# Patient Record
Sex: MALE | Race: WHITE | NOT HISPANIC OR LATINO
[De-identification: names, ages, dates, MRNs, and addresses within clinical notes are randomized per-mention and may not be internally consistent; named-entity substitution may affect disease eponyms.]

---

## 2017-04-18 ENCOUNTER — RX RENEWAL (OUTPATIENT)
Age: 67
End: 2017-04-18

## 2017-06-29 ENCOUNTER — APPOINTMENT (OUTPATIENT)
Dept: OPHTHALMOLOGY | Facility: CLINIC | Age: 67
End: 2017-06-29

## 2017-11-30 ENCOUNTER — APPOINTMENT (OUTPATIENT)
Dept: OPHTHALMOLOGY | Facility: CLINIC | Age: 67
End: 2017-11-30

## 2018-02-26 ENCOUNTER — APPOINTMENT (OUTPATIENT)
Dept: OPHTHALMOLOGY | Facility: CLINIC | Age: 68
End: 2018-02-26
Payer: MEDICARE

## 2018-02-26 PROCEDURE — 92083 EXTENDED VISUAL FIELD XM: CPT

## 2018-02-26 PROCEDURE — 92133 CPTRZD OPH DX IMG PST SGM ON: CPT

## 2018-02-26 PROCEDURE — 92012 INTRM OPH EXAM EST PATIENT: CPT

## 2018-09-10 ENCOUNTER — APPOINTMENT (OUTPATIENT)
Dept: OPHTHALMOLOGY | Facility: CLINIC | Age: 68
End: 2018-09-10
Payer: MEDICARE

## 2018-09-10 DIAGNOSIS — H31.101: ICD-10-CM

## 2018-09-10 PROCEDURE — 92014 COMPRE OPH EXAM EST PT 1/>: CPT

## 2018-09-10 PROCEDURE — 92020 GONIOSCOPY: CPT

## 2018-09-10 PROCEDURE — 92250 FUNDUS PHOTOGRAPHY W/I&R: CPT

## 2018-09-10 PROCEDURE — 92083 EXTENDED VISUAL FIELD XM: CPT

## 2018-09-10 PROCEDURE — 92226: CPT | Mod: RT

## 2019-02-01 ENCOUNTER — NON-APPOINTMENT (OUTPATIENT)
Age: 69
End: 2019-02-01

## 2019-02-01 ENCOUNTER — APPOINTMENT (OUTPATIENT)
Dept: INTERNAL MEDICINE | Facility: CLINIC | Age: 69
End: 2019-02-01
Payer: MEDICARE

## 2019-02-01 VITALS
OXYGEN SATURATION: 99 % | BODY MASS INDEX: 30.37 KG/M2 | TEMPERATURE: 97.2 F | DIASTOLIC BLOOD PRESSURE: 89 MMHG | HEIGHT: 66.14 IN | SYSTOLIC BLOOD PRESSURE: 168 MMHG | WEIGHT: 189 LBS | HEART RATE: 63 BPM

## 2019-02-01 DIAGNOSIS — Z78.9 OTHER SPECIFIED HEALTH STATUS: ICD-10-CM

## 2019-02-01 DIAGNOSIS — Z87.891 PERSONAL HISTORY OF NICOTINE DEPENDENCE: ICD-10-CM

## 2019-02-01 DIAGNOSIS — E66.9 OBESITY, UNSPECIFIED: ICD-10-CM

## 2019-02-01 DIAGNOSIS — Z87.19 PERSONAL HISTORY OF OTHER DISEASES OF THE DIGESTIVE SYSTEM: ICD-10-CM

## 2019-02-01 DIAGNOSIS — Z83.3 FAMILY HISTORY OF DIABETES MELLITUS: ICD-10-CM

## 2019-02-01 PROCEDURE — G0439: CPT

## 2019-02-01 PROCEDURE — 93000 ELECTROCARDIOGRAM COMPLETE: CPT

## 2019-02-01 NOTE — ASSESSMENT
[FreeTextEntry1] : CPE OF 68 Y OLD MALE WITH PMX OF HTN,ED,BPH AND IFG= LABS REVIEWED,EKG TODAY\par RTO 4 M

## 2019-03-13 ENCOUNTER — APPOINTMENT (OUTPATIENT)
Dept: OPHTHALMOLOGY | Facility: CLINIC | Age: 69
End: 2019-03-13
Payer: MEDICARE

## 2019-03-13 PROCEDURE — 92133 CPTRZD OPH DX IMG PST SGM ON: CPT

## 2019-03-13 PROCEDURE — 92014 COMPRE OPH EXAM EST PT 1/>: CPT

## 2019-03-13 PROCEDURE — 92083 EXTENDED VISUAL FIELD XM: CPT

## 2019-03-13 PROCEDURE — 76514 ECHO EXAM OF EYE THICKNESS: CPT

## 2019-06-03 ENCOUNTER — APPOINTMENT (OUTPATIENT)
Dept: INTERNAL MEDICINE | Facility: CLINIC | Age: 69
End: 2019-06-03
Payer: MEDICARE

## 2019-06-03 VITALS
DIASTOLIC BLOOD PRESSURE: 83 MMHG | RESPIRATION RATE: 14 BRPM | SYSTOLIC BLOOD PRESSURE: 145 MMHG | BODY MASS INDEX: 30.37 KG/M2 | TEMPERATURE: 98.1 F | WEIGHT: 189 LBS | HEART RATE: 70 BPM | OXYGEN SATURATION: 98 % | HEIGHT: 66 IN

## 2019-06-03 VITALS — SYSTOLIC BLOOD PRESSURE: 135 MMHG | DIASTOLIC BLOOD PRESSURE: 85 MMHG

## 2019-06-03 PROCEDURE — 99214 OFFICE O/P EST MOD 30 MIN: CPT

## 2019-06-03 NOTE — PHYSICAL EXAM
[Well Nourished] : well nourished [No Acute Distress] : no acute distress [Well Developed] : well developed [Well-Appearing] : well-appearing [Normal Sclera/Conjunctiva] : normal sclera/conjunctiva [PERRL] : pupils equal round and reactive to light [EOMI] : extraocular movements intact [Normal Oropharynx] : the oropharynx was normal [Normal Outer Ear/Nose] : the outer ears and nose were normal in appearance [No JVD] : no jugular venous distention [No Lymphadenopathy] : no lymphadenopathy [Supple] : supple [No Respiratory Distress] : no respiratory distress  [Thyroid Normal, No Nodules] : the thyroid was normal and there were no nodules present [Clear to Auscultation] : lungs were clear to auscultation bilaterally [Normal Rate] : normal rate  [No Accessory Muscle Use] : no accessory muscle use [Normal S1, S2] : normal S1 and S2 [Regular Rhythm] : with a regular rhythm [No Carotid Bruits] : no carotid bruits [No Murmur] : no murmur heard [No Varicosities] : no varicosities [No Abdominal Bruit] : a ~M bruit was not heard ~T in the abdomen [No Edema] : there was no peripheral edema [Pedal Pulses Present] : the pedal pulses are present [No Extremity Clubbing/Cyanosis] : no extremity clubbing/cyanosis [No Palpable Aorta] : no palpable aorta [Soft] : abdomen soft [Non Tender] : non-tender [Non-distended] : non-distended [No HSM] : no HSM [No Masses] : no abdominal mass palpated [Normal Bowel Sounds] : normal bowel sounds [Normal Posterior Cervical Nodes] : no posterior cervical lymphadenopathy [Normal Anterior Cervical Nodes] : no anterior cervical lymphadenopathy [No CVA Tenderness] : no CVA  tenderness [No Spinal Tenderness] : no spinal tenderness [Grossly Normal Strength/Tone] : grossly normal strength/tone [No Joint Swelling] : no joint swelling [No Rash] : no rash [Normal Gait] : normal gait [Coordination Grossly Intact] : coordination grossly intact [No Focal Deficits] : no focal deficits [Deep Tendon Reflexes (DTR)] : deep tendon reflexes were 2+ and symmetric [Normal Affect] : the affect was normal [Normal Insight/Judgement] : insight and judgment were intact

## 2019-06-03 NOTE — ASSESSMENT
[FreeTextEntry1] : F/U VISIT OF 68 Y OLD MALE WITH PMX OF HTN,IFG AND BPH= LABS ORDERED\par OF NOTE PT HAD STOP TELMISARTAN -HCTZ ON HIS OWN 2 M AGO WITH HOME /80 IN AVERAGE\par RTO IN 4 M

## 2019-06-03 NOTE — HISTORY OF PRESENT ILLNESS
[de-identified] : PT COMES FOR F/U \par HAD STOP TELMISARTAN /HCTZ ON HIS OWN 2 M AGO ;BP AT HOME 130/80 IN AVERAGE

## 2019-06-04 LAB
ALBUMIN SERPL ELPH-MCNC: 4.7 G/DL
ALP BLD-CCNC: 56 U/L
ALT SERPL-CCNC: 26 U/L
ANION GAP SERPL CALC-SCNC: 13 MMOL/L
AST SERPL-CCNC: 26 U/L
BILIRUB SERPL-MCNC: 0.6 MG/DL
BUN SERPL-MCNC: 20 MG/DL
CALCIUM SERPL-MCNC: 9.7 MG/DL
CHLORIDE SERPL-SCNC: 105 MMOL/L
CO2 SERPL-SCNC: 23 MMOL/L
CREAT SERPL-MCNC: 0.96 MG/DL
ESTIMATED AVERAGE GLUCOSE: 105 MG/DL
GLUCOSE SERPL-MCNC: 77 MG/DL
HBA1C MFR BLD HPLC: 5.3 %
POTASSIUM SERPL-SCNC: 4.4 MMOL/L
PROT SERPL-MCNC: 6.6 G/DL
SODIUM SERPL-SCNC: 141 MMOL/L

## 2019-09-18 ENCOUNTER — APPOINTMENT (OUTPATIENT)
Dept: OPHTHALMOLOGY | Facility: CLINIC | Age: 69
End: 2019-09-18

## 2019-10-07 ENCOUNTER — APPOINTMENT (OUTPATIENT)
Dept: INTERNAL MEDICINE | Facility: CLINIC | Age: 69
End: 2019-10-07

## 2019-11-01 ENCOUNTER — APPOINTMENT (OUTPATIENT)
Dept: INTERNAL MEDICINE | Facility: CLINIC | Age: 69
End: 2019-11-01
Payer: MEDICARE

## 2019-11-01 VITALS
RESPIRATION RATE: 12 BRPM | TEMPERATURE: 97.8 F | DIASTOLIC BLOOD PRESSURE: 80 MMHG | OXYGEN SATURATION: 97 % | WEIGHT: 188 LBS | BODY MASS INDEX: 30.22 KG/M2 | HEART RATE: 64 BPM | HEIGHT: 66 IN | SYSTOLIC BLOOD PRESSURE: 144 MMHG

## 2019-11-01 VITALS — DIASTOLIC BLOOD PRESSURE: 88 MMHG | SYSTOLIC BLOOD PRESSURE: 138 MMHG

## 2019-11-01 PROCEDURE — 99214 OFFICE O/P EST MOD 30 MIN: CPT

## 2019-11-01 NOTE — ASSESSMENT
[FreeTextEntry1] : 68 Y OLD MALE WITH PMX OF HTN AND IFG = CONTINUE DIET AND EXERCISE\par RTO IN 4 M\par DECLINES INFLUENZA VACCINE

## 2019-12-11 ENCOUNTER — NON-APPOINTMENT (OUTPATIENT)
Age: 69
End: 2019-12-11

## 2019-12-11 ENCOUNTER — APPOINTMENT (OUTPATIENT)
Dept: OPHTHALMOLOGY | Facility: CLINIC | Age: 69
End: 2019-12-11
Payer: MEDICARE

## 2019-12-11 PROCEDURE — 92012 INTRM OPH EXAM EST PATIENT: CPT

## 2019-12-11 PROCEDURE — 92083 EXTENDED VISUAL FIELD XM: CPT

## 2020-02-03 ENCOUNTER — APPOINTMENT (OUTPATIENT)
Dept: INTERNAL MEDICINE | Facility: CLINIC | Age: 70
End: 2020-02-03

## 2020-03-11 ENCOUNTER — NON-APPOINTMENT (OUTPATIENT)
Age: 70
End: 2020-03-11

## 2020-03-11 ENCOUNTER — APPOINTMENT (OUTPATIENT)
Dept: INTERNAL MEDICINE | Facility: CLINIC | Age: 70
End: 2020-03-11
Payer: MEDICARE

## 2020-03-11 ENCOUNTER — LABORATORY RESULT (OUTPATIENT)
Age: 70
End: 2020-03-11

## 2020-03-11 VITALS
HEART RATE: 65 BPM | OXYGEN SATURATION: 98 % | BODY MASS INDEX: 30.53 KG/M2 | RESPIRATION RATE: 13 BRPM | TEMPERATURE: 97.4 F | WEIGHT: 190 LBS | SYSTOLIC BLOOD PRESSURE: 170 MMHG | DIASTOLIC BLOOD PRESSURE: 90 MMHG | HEIGHT: 66 IN

## 2020-03-11 VITALS — DIASTOLIC BLOOD PRESSURE: 90 MMHG | SYSTOLIC BLOOD PRESSURE: 150 MMHG

## 2020-03-11 VITALS — DIASTOLIC BLOOD PRESSURE: 88 MMHG | SYSTOLIC BLOOD PRESSURE: 145 MMHG

## 2020-03-11 LAB
25(OH)D3 SERPL-MCNC: 39.5 NG/ML
ALBUMIN SERPL ELPH-MCNC: 4.7 G/DL
ALP BLD-CCNC: 57 U/L
ALT SERPL-CCNC: 28 U/L
ANION GAP SERPL CALC-SCNC: 16 MMOL/L
AST SERPL-CCNC: 28 U/L
BASOPHILS # BLD AUTO: 0.02 K/UL
BASOPHILS NFR BLD AUTO: 0.3 %
BILIRUB SERPL-MCNC: 0.6 MG/DL
BUN SERPL-MCNC: 13 MG/DL
CALCIUM SERPL-MCNC: 9.7 MG/DL
CHLORIDE SERPL-SCNC: 104 MMOL/L
CO2 SERPL-SCNC: 22 MMOL/L
CREAT SERPL-MCNC: 1.02 MG/DL
EOSINOPHIL # BLD AUTO: 0.03 K/UL
EOSINOPHIL NFR BLD AUTO: 0.5 %
ESTIMATED AVERAGE GLUCOSE: 108 MG/DL
GLUCOSE SERPL-MCNC: 75 MG/DL
HBA1C MFR BLD HPLC: 5.4 %
HCT VFR BLD CALC: 48.1 %
HGB BLD-MCNC: 15.5 G/DL
IMM GRANULOCYTES NFR BLD AUTO: 0.2 %
LYMPHOCYTES # BLD AUTO: 1.94 K/UL
LYMPHOCYTES NFR BLD AUTO: 29.6 %
MAN DIFF?: NORMAL
MCHC RBC-ENTMCNC: 30.1 PG
MCHC RBC-ENTMCNC: 32.2 GM/DL
MCV RBC AUTO: 93.4 FL
MONOCYTES # BLD AUTO: 0.4 K/UL
MONOCYTES NFR BLD AUTO: 6.1 %
NEUTROPHILS # BLD AUTO: 4.15 K/UL
NEUTROPHILS NFR BLD AUTO: 63.3 %
PLATELET # BLD AUTO: 195 K/UL
POTASSIUM SERPL-SCNC: 4.7 MMOL/L
PROT SERPL-MCNC: 7.2 G/DL
PSA FREE FLD-MCNC: 43 %
PSA FREE SERPL-MCNC: 0.12 NG/ML
PSA SERPL-MCNC: 0.27 NG/ML
RBC # BLD: 5.15 M/UL
RBC # FLD: 13.3 %
SODIUM SERPL-SCNC: 142 MMOL/L
TSH SERPL-ACNC: 1.63 UIU/ML
WBC # FLD AUTO: 6.55 K/UL

## 2020-03-11 PROCEDURE — G0439: CPT

## 2020-03-11 PROCEDURE — 93000 ELECTROCARDIOGRAM COMPLETE: CPT

## 2020-03-11 NOTE — HISTORY OF PRESENT ILLNESS
[de-identified] : PT SEEN BY ROB REGULARLY MEDS HAD BEEN CHANGED\par BP AT HOME 130-80 IN AVERAGE \par HAD COLONOSCOPY ABOUT 2 Y AGO

## 2020-03-11 NOTE — ASSESSMENT
[FreeTextEntry1] : CPE  OF 69 Y OLD MALE WITH PMX OF HTN AND IFG ON DIET= LABS AND EKG TODAY \par RTO IN 4 M \par WEEKLY BP MONITORING AT HOME\par GLAUCOMA = AS PER OPHTHALMOLOGY

## 2020-03-12 LAB
APPEARANCE: CLEAR
BILIRUBIN URINE: NEGATIVE
BLOOD URINE: NEGATIVE
COLOR: NORMAL
CREAT SPEC-SCNC: 48 MG/DL
GLUCOSE QUALITATIVE U: NEGATIVE
KETONES URINE: NEGATIVE
LEUKOCYTE ESTERASE URINE: ABNORMAL
MICROALBUMIN 24H UR DL<=1MG/L-MCNC: <1.2 MG/DL
MICROALBUMIN/CREAT 24H UR-RTO: NORMAL MG/G
NITRITE URINE: NEGATIVE
PH URINE: 6.5
PROTEIN URINE: NEGATIVE
SPECIFIC GRAVITY URINE: 1.01
UROBILINOGEN URINE: NORMAL

## 2020-03-13 LAB
CHOLEST SERPL-MCNC: 172 MG/DL
CHOLEST/HDLC SERPL: 2.8 RATIO
HDLC SERPL-MCNC: 61 MG/DL
LDLC SERPL CALC-MCNC: 91 MG/DL
TRIGL SERPL-MCNC: 98 MG/DL

## 2020-06-15 ENCOUNTER — APPOINTMENT (OUTPATIENT)
Dept: HEART AND VASCULAR | Facility: CLINIC | Age: 70
End: 2020-06-15
Payer: MEDICARE

## 2020-06-15 PROCEDURE — 36415 COLL VENOUS BLD VENIPUNCTURE: CPT | Mod: CS

## 2020-06-16 LAB
SARS-COV-2 IGG SERPL IA-ACNC: <0.1 INDEX
SARS-COV-2 IGG SERPL QL IA: NEGATIVE

## 2020-07-15 ENCOUNTER — APPOINTMENT (OUTPATIENT)
Dept: INTERNAL MEDICINE | Facility: CLINIC | Age: 70
End: 2020-07-15
Payer: MEDICARE

## 2020-07-15 VITALS
OXYGEN SATURATION: 99 % | TEMPERATURE: 98.1 F | HEIGHT: 66 IN | RESPIRATION RATE: 14 BRPM | HEART RATE: 55 BPM | SYSTOLIC BLOOD PRESSURE: 140 MMHG | DIASTOLIC BLOOD PRESSURE: 78 MMHG | BODY MASS INDEX: 29.41 KG/M2 | WEIGHT: 183 LBS

## 2020-07-15 DIAGNOSIS — Z20.828 CONTACT WITH AND (SUSPECTED) EXPOSURE TO OTHER VIRAL COMMUNICABLE DISEASES: ICD-10-CM

## 2020-07-15 PROCEDURE — 99214 OFFICE O/P EST MOD 30 MIN: CPT | Mod: CS

## 2020-07-15 NOTE — HISTORY OF PRESENT ILLNESS
[de-identified] : PT COMES FOR F/U HTN ,DYSLIPIDEMIA AND IFG= LAST LABS 3/2020 WNL\par LOOSING WT WITH LOW FAT LOW CARB DIET

## 2020-07-15 NOTE — PHYSICAL EXAM
[Well Developed] : well developed [Well Nourished] : well nourished [No Acute Distress] : no acute distress [Well-Appearing] : well-appearing [Normal Sclera/Conjunctiva] : normal sclera/conjunctiva [PERRL] : pupils equal round and reactive to light [EOMI] : extraocular movements intact [Normal Oropharynx] : the oropharynx was normal [No JVD] : no jugular venous distention [No Lymphadenopathy] : no lymphadenopathy [Supple] : supple [No Respiratory Distress] : no respiratory distress  [Thyroid Normal, No Nodules] : the thyroid was normal and there were no nodules present [Normal Rate] : normal rate  [Clear to Auscultation] : lungs were clear to auscultation bilaterally [No Accessory Muscle Use] : no accessory muscle use [Regular Rhythm] : with a regular rhythm [Normal S1, S2] : normal S1 and S2 [No Carotid Bruits] : no carotid bruits [No Murmur] : no murmur heard [No Abdominal Bruit] : a ~M bruit was not heard ~T in the abdomen [No Varicosities] : no varicosities [No Edema] : there was no peripheral edema [Pedal Pulses Present] : the pedal pulses are present [No Palpable Aorta] : no palpable aorta [No Extremity Clubbing/Cyanosis] : no extremity clubbing/cyanosis [Soft] : abdomen soft [Non Tender] : non-tender [Non-distended] : non-distended [No Masses] : no abdominal mass palpated [Normal Bowel Sounds] : normal bowel sounds [No HSM] : no HSM [Normal Posterior Cervical Nodes] : no posterior cervical lymphadenopathy [Normal Anterior Cervical Nodes] : no anterior cervical lymphadenopathy [No CVA Tenderness] : no CVA  tenderness [No Spinal Tenderness] : no spinal tenderness [No Joint Swelling] : no joint swelling [Grossly Normal Strength/Tone] : grossly normal strength/tone [No Rash] : no rash [Coordination Grossly Intact] : coordination grossly intact [No Focal Deficits] : no focal deficits [Normal Gait] : normal gait [Normal Affect] : the affect was normal [Deep Tendon Reflexes (DTR)] : deep tendon reflexes were 2+ and symmetric [Normal Insight/Judgement] : insight and judgment were intact

## 2020-07-15 NOTE — ASSESSMENT
[FreeTextEntry1] : F/U VISIT OF 69 Y OLD MALE WITH PMX OH HTN = AT HOME 130/80 IN AVERAGE,CONTINUE LIFESTYLE MODIF\par IFG AND DYSLIPIDEMIA = BETTER,LABS NEXT VISIT\par RTO IN 4 M

## 2020-07-23 ENCOUNTER — NON-APPOINTMENT (OUTPATIENT)
Age: 70
End: 2020-07-23

## 2020-07-23 ENCOUNTER — APPOINTMENT (OUTPATIENT)
Dept: OPHTHALMOLOGY | Facility: CLINIC | Age: 70
End: 2020-07-23
Payer: MEDICARE

## 2020-07-23 PROCEDURE — 92083 EXTENDED VISUAL FIELD XM: CPT

## 2020-07-23 PROCEDURE — 92020 GONIOSCOPY: CPT

## 2020-07-23 PROCEDURE — 92014 COMPRE OPH EXAM EST PT 1/>: CPT

## 2020-07-23 PROCEDURE — 92250 FUNDUS PHOTOGRAPHY W/I&R: CPT

## 2020-11-11 ENCOUNTER — APPOINTMENT (OUTPATIENT)
Dept: INTERNAL MEDICINE | Facility: CLINIC | Age: 70
End: 2020-11-11
Payer: MEDICARE

## 2020-11-11 VITALS
RESPIRATION RATE: 14 BRPM | BODY MASS INDEX: 29.41 KG/M2 | OXYGEN SATURATION: 99 % | HEART RATE: 66 BPM | SYSTOLIC BLOOD PRESSURE: 142 MMHG | HEIGHT: 66 IN | WEIGHT: 183 LBS | DIASTOLIC BLOOD PRESSURE: 89 MMHG | TEMPERATURE: 97.7 F

## 2020-11-11 LAB
ALBUMIN SERPL ELPH-MCNC: 4.8 G/DL
ALP BLD-CCNC: 56 U/L
ALT SERPL-CCNC: 30 U/L
ANION GAP SERPL CALC-SCNC: 13 MMOL/L
AST SERPL-CCNC: 29 U/L
BILIRUB SERPL-MCNC: 0.6 MG/DL
BUN SERPL-MCNC: 17 MG/DL
CALCIUM SERPL-MCNC: 9.7 MG/DL
CHLORIDE SERPL-SCNC: 103 MMOL/L
CO2 SERPL-SCNC: 24 MMOL/L
CREAT SERPL-MCNC: 0.98 MG/DL
GLUCOSE SERPL-MCNC: 90 MG/DL
POTASSIUM SERPL-SCNC: 4.5 MMOL/L
PROT SERPL-MCNC: 7 G/DL
SODIUM SERPL-SCNC: 140 MMOL/L

## 2020-11-11 PROCEDURE — 99214 OFFICE O/P EST MOD 30 MIN: CPT

## 2020-11-11 NOTE — ASSESSMENT
[FreeTextEntry1] : F/U VISIT OF 69 Y OLD MALE WITH PMX OF HTN STAGE 1,BP AT HOME UNDER 130/80,OBSERVE\par DYSLIPIDEMIA AND IFG = LABS \par DECLINES INFLUENZA VACCINE

## 2020-11-12 LAB
ESTIMATED AVERAGE GLUCOSE: 108 MG/DL
HBA1C MFR BLD HPLC: 5.4 %

## 2020-11-13 LAB
CHOLEST SERPL-MCNC: 176 MG/DL
HDLC SERPL-MCNC: 59 MG/DL
LDLC SERPL CALC-MCNC: 103 MG/DL
NONHDLC SERPL-MCNC: 116 MG/DL
TRIGL SERPL-MCNC: 69 MG/DL

## 2021-02-22 ENCOUNTER — APPOINTMENT (OUTPATIENT)
Dept: OPHTHALMOLOGY | Facility: CLINIC | Age: 71
End: 2021-02-22
Payer: MEDICARE

## 2021-02-22 ENCOUNTER — NON-APPOINTMENT (OUTPATIENT)
Age: 71
End: 2021-02-22

## 2021-02-22 PROCEDURE — 92133 CPTRZD OPH DX IMG PST SGM ON: CPT

## 2021-02-22 PROCEDURE — 92083 EXTENDED VISUAL FIELD XM: CPT

## 2021-02-22 PROCEDURE — 99213 OFFICE O/P EST LOW 20 MIN: CPT

## 2021-03-17 ENCOUNTER — APPOINTMENT (OUTPATIENT)
Dept: INTERNAL MEDICINE | Facility: CLINIC | Age: 71
End: 2021-03-17
Payer: MEDICARE

## 2021-03-17 ENCOUNTER — LABORATORY RESULT (OUTPATIENT)
Age: 71
End: 2021-03-17

## 2021-03-17 ENCOUNTER — NON-APPOINTMENT (OUTPATIENT)
Age: 71
End: 2021-03-17

## 2021-03-17 VITALS
SYSTOLIC BLOOD PRESSURE: 145 MMHG | BODY MASS INDEX: 29.89 KG/M2 | DIASTOLIC BLOOD PRESSURE: 79 MMHG | OXYGEN SATURATION: 98 % | HEART RATE: 68 BPM | TEMPERATURE: 98.3 F | WEIGHT: 186 LBS | HEIGHT: 66 IN | RESPIRATION RATE: 14 BRPM

## 2021-03-17 VITALS — SYSTOLIC BLOOD PRESSURE: 138 MMHG | DIASTOLIC BLOOD PRESSURE: 88 MMHG

## 2021-03-17 DIAGNOSIS — Z12.11 ENCOUNTER FOR SCREENING FOR MALIGNANT NEOPLASM OF COLON: ICD-10-CM

## 2021-03-17 LAB
25(OH)D3 SERPL-MCNC: 29.6 NG/ML
ALBUMIN SERPL ELPH-MCNC: 4.4 G/DL
ALP BLD-CCNC: 61 U/L
ALT SERPL-CCNC: 28 U/L
ANION GAP SERPL CALC-SCNC: 12 MMOL/L
APPEARANCE: ABNORMAL
AST SERPL-CCNC: 29 U/L
BILIRUB SERPL-MCNC: 0.5 MG/DL
BILIRUBIN URINE: NEGATIVE
BLOOD URINE: NEGATIVE
BUN SERPL-MCNC: 12 MG/DL
CALCIUM SERPL-MCNC: 9.4 MG/DL
CHLORIDE SERPL-SCNC: 106 MMOL/L
CHOLEST SERPL-MCNC: 169 MG/DL
CO2 SERPL-SCNC: 24 MMOL/L
COLOR: YELLOW
CREAT SERPL-MCNC: 0.92 MG/DL
ESTIMATED AVERAGE GLUCOSE: 105 MG/DL
GLUCOSE QUALITATIVE U: NEGATIVE
GLUCOSE SERPL-MCNC: 88 MG/DL
HBA1C MFR BLD HPLC: 5.3 %
HDLC SERPL-MCNC: 53 MG/DL
KETONES URINE: NEGATIVE
LDLC SERPL CALC-MCNC: 100 MG/DL
LEUKOCYTE ESTERASE URINE: NEGATIVE
NITRITE URINE: NEGATIVE
NONHDLC SERPL-MCNC: 115 MG/DL
PH URINE: 5.5
POTASSIUM SERPL-SCNC: 4.2 MMOL/L
PROT SERPL-MCNC: 7 G/DL
PROTEIN URINE: NEGATIVE
PSA FREE FLD-MCNC: 35 %
PSA FREE SERPL-MCNC: 0.1 NG/ML
PSA SERPL-MCNC: 0.29 NG/ML
SODIUM SERPL-SCNC: 141 MMOL/L
SPECIFIC GRAVITY URINE: 1.02
TRIGL SERPL-MCNC: 76 MG/DL
TSH SERPL-ACNC: 1.52 UIU/ML
UROBILINOGEN URINE: NORMAL

## 2021-03-17 PROCEDURE — G0439: CPT

## 2021-03-17 PROCEDURE — 93000 ELECTROCARDIOGRAM COMPLETE: CPT

## 2021-03-17 NOTE — ASSESSMENT
[FreeTextEntry1] : CPE OF 70 Y OLD MALE = LABS AND EKG \par HAD COVID-19 VACCINE X2 DOSES\par F/U GI FOR COLON CANCER SCREENING

## 2021-03-17 NOTE — HISTORY OF PRESENT ILLNESS
[de-identified] : PT OFFERS NO NEW COMPLAINS;EXERCISES REG \par UP TO DATE WITH OPHTH \par MAY NEED COLONOSCOPY SOON WITH DR DE LA O;RECOMM TO CALL\par BP AT HOME 120-130/70-80

## 2021-03-22 LAB
BASOPHILS # BLD AUTO: 0.04 K/UL
BASOPHILS NFR BLD AUTO: 0.8 %
EOSINOPHIL # BLD AUTO: 0.09 K/UL
EOSINOPHIL NFR BLD AUTO: 1.7 %
HCT VFR BLD CALC: 45.6 %
HGB BLD-MCNC: 15.1 G/DL
IMM GRANULOCYTES NFR BLD AUTO: 0.2 %
LYMPHOCYTES # BLD AUTO: 1.7 K/UL
LYMPHOCYTES NFR BLD AUTO: 32.6 %
MAN DIFF?: NORMAL
MCHC RBC-ENTMCNC: 29.8 PG
MCHC RBC-ENTMCNC: 33.1 GM/DL
MCV RBC AUTO: 90.1 FL
MONOCYTES # BLD AUTO: 0.37 K/UL
MONOCYTES NFR BLD AUTO: 7.1 %
NEUTROPHILS # BLD AUTO: 3.01 K/UL
NEUTROPHILS NFR BLD AUTO: 57.6 %
PLATELET # BLD AUTO: 211 K/UL
RBC # BLD: 5.06 M/UL
RBC # FLD: 13.4 %
WBC # FLD AUTO: 5.22 K/UL

## 2021-07-21 ENCOUNTER — NON-APPOINTMENT (OUTPATIENT)
Age: 71
End: 2021-07-21

## 2021-07-21 ENCOUNTER — APPOINTMENT (OUTPATIENT)
Dept: OPHTHALMOLOGY | Facility: CLINIC | Age: 71
End: 2021-07-21
Payer: MEDICARE

## 2021-07-21 PROCEDURE — 92020 GONIOSCOPY: CPT

## 2021-07-21 PROCEDURE — 92250 FUNDUS PHOTOGRAPHY W/I&R: CPT

## 2021-07-21 PROCEDURE — 99213 OFFICE O/P EST LOW 20 MIN: CPT

## 2021-07-21 PROCEDURE — 92083 EXTENDED VISUAL FIELD XM: CPT

## 2021-07-21 PROCEDURE — 76513 OPH US DX ANT SGM US UNI/BI: CPT | Mod: LT

## 2021-07-28 ENCOUNTER — APPOINTMENT (OUTPATIENT)
Dept: INTERNAL MEDICINE | Facility: CLINIC | Age: 71
End: 2021-07-28
Payer: MEDICARE

## 2021-07-28 VITALS
BODY MASS INDEX: 30.37 KG/M2 | RESPIRATION RATE: 15 BRPM | HEART RATE: 59 BPM | DIASTOLIC BLOOD PRESSURE: 82 MMHG | TEMPERATURE: 97.7 F | WEIGHT: 189 LBS | SYSTOLIC BLOOD PRESSURE: 137 MMHG | HEIGHT: 66 IN | OXYGEN SATURATION: 98 %

## 2021-07-28 VITALS — DIASTOLIC BLOOD PRESSURE: 70 MMHG | SYSTOLIC BLOOD PRESSURE: 130 MMHG

## 2021-07-28 PROCEDURE — 99214 OFFICE O/P EST MOD 30 MIN: CPT

## 2021-07-28 NOTE — ASSESSMENT
[FreeTextEntry1] : 70 Y OLD MALE WITH HTN STABLE ON LIFESTYLE CHANGES\par IFG AND DYSLIPIDEMIA = MUCH BETTER LAST LABS= CONTINUE DIET AND EXERCISE\par RTO IN 3-4 M

## 2021-10-25 ENCOUNTER — APPOINTMENT (OUTPATIENT)
Dept: OPHTHALMOLOGY | Facility: CLINIC | Age: 71
End: 2021-10-25
Payer: MEDICARE

## 2021-10-25 ENCOUNTER — NON-APPOINTMENT (OUTPATIENT)
Age: 71
End: 2021-10-25

## 2021-10-25 PROCEDURE — 66762 REVISION OF IRIS: CPT | Mod: LT

## 2021-11-15 ENCOUNTER — APPOINTMENT (OUTPATIENT)
Dept: INTERNAL MEDICINE | Facility: CLINIC | Age: 71
End: 2021-11-15
Payer: MEDICARE

## 2021-11-15 VITALS
DIASTOLIC BLOOD PRESSURE: 81 MMHG | RESPIRATION RATE: 14 BRPM | WEIGHT: 189 LBS | SYSTOLIC BLOOD PRESSURE: 147 MMHG | BODY MASS INDEX: 30.37 KG/M2 | TEMPERATURE: 98.6 F | OXYGEN SATURATION: 99 % | HEIGHT: 66 IN | HEART RATE: 60 BPM

## 2021-11-15 VITALS — SYSTOLIC BLOOD PRESSURE: 140 MMHG | DIASTOLIC BLOOD PRESSURE: 80 MMHG

## 2021-11-15 LAB
ALBUMIN SERPL ELPH-MCNC: 4.6 G/DL
ALP BLD-CCNC: 67 U/L
ALT SERPL-CCNC: 33 U/L
ANION GAP SERPL CALC-SCNC: 12 MMOL/L
AST SERPL-CCNC: 25 U/L
BILIRUB SERPL-MCNC: 0.4 MG/DL
BUN SERPL-MCNC: 18 MG/DL
CALCIUM SERPL-MCNC: 9.5 MG/DL
CHLORIDE SERPL-SCNC: 107 MMOL/L
CHOLEST SERPL-MCNC: 193 MG/DL
CO2 SERPL-SCNC: 23 MMOL/L
CREAT SERPL-MCNC: 1 MG/DL
GLUCOSE SERPL-MCNC: 100 MG/DL
HDLC SERPL-MCNC: 63 MG/DL
LDLC SERPL CALC-MCNC: 116 MG/DL
NONHDLC SERPL-MCNC: 130 MG/DL
POTASSIUM SERPL-SCNC: 4.5 MMOL/L
PROT SERPL-MCNC: 7.1 G/DL
SODIUM SERPL-SCNC: 142 MMOL/L
TRIGL SERPL-MCNC: 68 MG/DL

## 2021-11-15 PROCEDURE — 99214 OFFICE O/P EST MOD 30 MIN: CPT

## 2021-11-15 NOTE — ASSESSMENT
[FreeTextEntry1] : 70 Y OLD MALE WITH PMX OF HTN ,IFG AND DYSLIPIDEMIA ON LIFESTYLE MODIFICATIONS= LABS AND RTO FOR CPE 3/22\par RECOMM MODERNA BOOSTER,2ND DOSE 3/21

## 2021-11-15 NOTE — PHYSICAL EXAM

## 2021-11-16 LAB
ESTIMATED AVERAGE GLUCOSE: 108 MG/DL
HBA1C MFR BLD HPLC: 5.4 %

## 2022-03-17 ENCOUNTER — NON-APPOINTMENT (OUTPATIENT)
Age: 72
End: 2022-03-17

## 2022-03-17 ENCOUNTER — APPOINTMENT (OUTPATIENT)
Dept: OPHTHALMOLOGY | Facility: CLINIC | Age: 72
End: 2022-03-17
Payer: MEDICARE

## 2022-03-17 PROCEDURE — 92250 FUNDUS PHOTOGRAPHY W/I&R: CPT

## 2022-03-17 PROCEDURE — 92083 EXTENDED VISUAL FIELD XM: CPT

## 2022-03-17 PROCEDURE — 92014 COMPRE OPH EXAM EST PT 1/>: CPT

## 2022-03-17 PROCEDURE — 92020 GONIOSCOPY: CPT

## 2022-03-28 ENCOUNTER — NON-APPOINTMENT (OUTPATIENT)
Age: 72
End: 2022-03-28

## 2022-03-28 ENCOUNTER — APPOINTMENT (OUTPATIENT)
Dept: INTERNAL MEDICINE | Facility: CLINIC | Age: 72
End: 2022-03-28
Payer: MEDICARE

## 2022-03-28 ENCOUNTER — LABORATORY RESULT (OUTPATIENT)
Age: 72
End: 2022-03-28

## 2022-03-28 VITALS — SYSTOLIC BLOOD PRESSURE: 145 MMHG | DIASTOLIC BLOOD PRESSURE: 80 MMHG

## 2022-03-28 VITALS
WEIGHT: 185 LBS | HEIGHT: 66 IN | DIASTOLIC BLOOD PRESSURE: 84 MMHG | SYSTOLIC BLOOD PRESSURE: 142 MMHG | OXYGEN SATURATION: 96 % | BODY MASS INDEX: 29.73 KG/M2 | HEART RATE: 65 BPM | TEMPERATURE: 98.4 F | RESPIRATION RATE: 14 BRPM

## 2022-03-28 LAB
25(OH)D3 SERPL-MCNC: 27.1 NG/ML
ALBUMIN SERPL ELPH-MCNC: 4.7 G/DL
ALP BLD-CCNC: 60 U/L
ALT SERPL-CCNC: 31 U/L
ANION GAP SERPL CALC-SCNC: 14 MMOL/L
APPEARANCE: CLEAR
AST SERPL-CCNC: 25 U/L
BILIRUB SERPL-MCNC: 0.7 MG/DL
BILIRUBIN URINE: NEGATIVE
BLOOD URINE: NEGATIVE
BUN SERPL-MCNC: 20 MG/DL
CALCIUM SERPL-MCNC: 9.8 MG/DL
CHLORIDE SERPL-SCNC: 107 MMOL/L
CHOLEST SERPL-MCNC: 174 MG/DL
CO2 SERPL-SCNC: 23 MMOL/L
COLOR: NORMAL
CREAT SERPL-MCNC: 1.04 MG/DL
EGFR: 77 ML/MIN/1.73M2
GLUCOSE QUALITATIVE U: NEGATIVE
GLUCOSE SERPL-MCNC: 85 MG/DL
HDLC SERPL-MCNC: 56 MG/DL
KETONES URINE: NEGATIVE
LDLC SERPL CALC-MCNC: 103 MG/DL
LEUKOCYTE ESTERASE URINE: ABNORMAL
NITRITE URINE: NEGATIVE
NONHDLC SERPL-MCNC: 118 MG/DL
PH URINE: 5.5
POTASSIUM SERPL-SCNC: 4.2 MMOL/L
PROT SERPL-MCNC: 7.3 G/DL
PROTEIN URINE: NEGATIVE
PSA FREE FLD-MCNC: 45 %
PSA FREE SERPL-MCNC: 0.13 NG/ML
PSA SERPL-MCNC: 0.28 NG/ML
SODIUM SERPL-SCNC: 144 MMOL/L
SPECIFIC GRAVITY URINE: 1.02
TRIGL SERPL-MCNC: 75 MG/DL
TSH SERPL-ACNC: 1.51 UIU/ML
UROBILINOGEN URINE: NORMAL

## 2022-03-28 PROCEDURE — 93000 ELECTROCARDIOGRAM COMPLETE: CPT

## 2022-03-28 PROCEDURE — G0439: CPT

## 2022-03-28 NOTE — HISTORY OF PRESENT ILLNESS
[de-identified] : COMES FOR CPE \par LESS EXERCISE LATELY \par FOR COLONOSCOPY THIS YEAR\par HOME /80 IN AVERAGE

## 2022-03-28 NOTE — ASSESSMENT
[FreeTextEntry1] : CPE OF 71 Y OLD MALE WITH PMX OF IFG AND DIET CONTROL HTN AS WELL AS DYSLIPIDEMIA = LABS AND EKG \par RTO 4 M \par RECOMM COLONOSCOPY AND INCREASE EXERCISE

## 2022-04-04 LAB
BASOPHILS # BLD AUTO: 0.02 K/UL
BASOPHILS NFR BLD AUTO: 0.3 %
EOSINOPHIL # BLD AUTO: 0.03 K/UL
EOSINOPHIL NFR BLD AUTO: 0.5 %
HCT VFR BLD CALC: 47.3 %
HGB BLD-MCNC: 15.4 G/DL
IMM GRANULOCYTES NFR BLD AUTO: 0.2 %
LYMPHOCYTES # BLD AUTO: 2.09 K/UL
LYMPHOCYTES NFR BLD AUTO: 32 %
MAN DIFF?: NORMAL
MCHC RBC-ENTMCNC: 29.7 PG
MCHC RBC-ENTMCNC: 32.6 GM/DL
MCV RBC AUTO: 91.3 FL
MONOCYTES # BLD AUTO: 0.41 K/UL
MONOCYTES NFR BLD AUTO: 6.3 %
NEUTROPHILS # BLD AUTO: 3.98 K/UL
NEUTROPHILS NFR BLD AUTO: 60.7 %
PLATELET # BLD AUTO: 214 K/UL
RBC # BLD: 5.18 M/UL
RBC # FLD: 13.6 %
WBC # FLD AUTO: 6.54 K/UL

## 2022-07-06 ENCOUNTER — APPOINTMENT (OUTPATIENT)
Dept: INTERNAL MEDICINE | Facility: CLINIC | Age: 72
End: 2022-07-06

## 2022-07-06 VITALS — DIASTOLIC BLOOD PRESSURE: 85 MMHG | SYSTOLIC BLOOD PRESSURE: 140 MMHG

## 2022-07-06 VITALS
HEART RATE: 64 BPM | TEMPERATURE: 98.3 F | OXYGEN SATURATION: 99 % | SYSTOLIC BLOOD PRESSURE: 147 MMHG | RESPIRATION RATE: 16 BRPM | WEIGHT: 188 LBS | BODY MASS INDEX: 31.32 KG/M2 | DIASTOLIC BLOOD PRESSURE: 79 MMHG | HEIGHT: 65 IN

## 2022-07-06 PROCEDURE — 99214 OFFICE O/P EST MOD 30 MIN: CPT

## 2022-07-06 NOTE — ASSESSMENT
[FreeTextEntry1] : 71 Y OLD MALE WITH PMX OF GLAUCOMA = AS PER OPTHA \par HTN ,IFG NAD DYSLIPIDEMIA = ON DIET \par RTO 3-4 M

## 2022-08-18 ENCOUNTER — APPOINTMENT (OUTPATIENT)
Dept: OPHTHALMOLOGY | Facility: CLINIC | Age: 72
End: 2022-08-18

## 2022-08-18 ENCOUNTER — NON-APPOINTMENT (OUTPATIENT)
Age: 72
End: 2022-08-18

## 2022-08-18 PROCEDURE — 92083 EXTENDED VISUAL FIELD XM: CPT

## 2022-08-18 PROCEDURE — 92012 INTRM OPH EXAM EST PATIENT: CPT

## 2022-08-18 PROCEDURE — 92133 CPTRZD OPH DX IMG PST SGM ON: CPT

## 2022-10-31 ENCOUNTER — APPOINTMENT (OUTPATIENT)
Dept: INTERNAL MEDICINE | Facility: CLINIC | Age: 72
End: 2022-10-31

## 2022-10-31 VITALS
RESPIRATION RATE: 14 BRPM | BODY MASS INDEX: 31.65 KG/M2 | WEIGHT: 190 LBS | HEART RATE: 65 BPM | SYSTOLIC BLOOD PRESSURE: 172 MMHG | DIASTOLIC BLOOD PRESSURE: 91 MMHG | TEMPERATURE: 97.5 F | OXYGEN SATURATION: 97 % | HEIGHT: 65 IN

## 2022-10-31 VITALS — DIASTOLIC BLOOD PRESSURE: 90 MMHG | SYSTOLIC BLOOD PRESSURE: 145 MMHG

## 2022-10-31 DIAGNOSIS — H40.1131 PRIMARY OPEN-ANGLE GLAUCOMA, BILATERAL, MILD STAGE: ICD-10-CM

## 2022-10-31 PROCEDURE — 99214 OFFICE O/P EST MOD 30 MIN: CPT

## 2022-10-31 NOTE — ASSESSMENT
[FreeTextEntry1] : 71 Y OLD MALE WITH LABILE HTN = HOME BP MONITORING IF ABOVE 140/90 WILL START MEDS\par RECOMM BIVALENT COVID -19 VACCINE \par RTO 3-4 M

## 2022-10-31 NOTE — HISTORY OF PRESENT ILLNESS
[de-identified] : COMES FOR F/U \par HOME -135/80 \par DECLINES INFLUENZA VACCINE \par 3ER COVID-19 VACCINE SEVERAL MONTHS AGO

## 2022-11-01 LAB
ALBUMIN SERPL ELPH-MCNC: 4.6 G/DL
ALP BLD-CCNC: 63 U/L
ALT SERPL-CCNC: 33 U/L
ANION GAP SERPL CALC-SCNC: 12 MMOL/L
AST SERPL-CCNC: 27 U/L
BILIRUB SERPL-MCNC: 0.7 MG/DL
BUN SERPL-MCNC: 17 MG/DL
CALCIUM SERPL-MCNC: 9.5 MG/DL
CHLORIDE SERPL-SCNC: 103 MMOL/L
CO2 SERPL-SCNC: 25 MMOL/L
CREAT SERPL-MCNC: 0.95 MG/DL
EGFR: 86 ML/MIN/1.73M2
ESTIMATED AVERAGE GLUCOSE: 117 MG/DL
GLUCOSE SERPL-MCNC: 85 MG/DL
HBA1C MFR BLD HPLC: 5.7 %
POTASSIUM SERPL-SCNC: 4.3 MMOL/L
PROT SERPL-MCNC: 7.3 G/DL
SODIUM SERPL-SCNC: 140 MMOL/L

## 2022-11-03 LAB
CHOLEST SERPL-MCNC: 189 MG/DL
HDLC SERPL-MCNC: 62 MG/DL
LDLC SERPL CALC-MCNC: 109 MG/DL
NONHDLC SERPL-MCNC: 127 MG/DL
TRIGL SERPL-MCNC: 90 MG/DL

## 2022-11-09 ENCOUNTER — APPOINTMENT (OUTPATIENT)
Dept: INTERNAL MEDICINE | Facility: CLINIC | Age: 72
End: 2022-11-09

## 2023-03-16 ENCOUNTER — NON-APPOINTMENT (OUTPATIENT)
Age: 73
End: 2023-03-16

## 2023-03-16 ENCOUNTER — APPOINTMENT (OUTPATIENT)
Dept: OPHTHALMOLOGY | Facility: CLINIC | Age: 73
End: 2023-03-16
Payer: MEDICARE

## 2023-03-16 PROCEDURE — 92083 EXTENDED VISUAL FIELD XM: CPT

## 2023-03-16 PROCEDURE — 92250 FUNDUS PHOTOGRAPHY W/I&R: CPT

## 2023-03-16 PROCEDURE — 92014 COMPRE OPH EXAM EST PT 1/>: CPT

## 2023-03-16 PROCEDURE — 92020 GONIOSCOPY: CPT

## 2023-03-17 ENCOUNTER — LABORATORY RESULT (OUTPATIENT)
Age: 73
End: 2023-03-17

## 2023-03-17 ENCOUNTER — NON-APPOINTMENT (OUTPATIENT)
Age: 73
End: 2023-03-17

## 2023-03-17 ENCOUNTER — APPOINTMENT (OUTPATIENT)
Dept: INTERNAL MEDICINE | Facility: CLINIC | Age: 73
End: 2023-03-17
Payer: MEDICARE

## 2023-03-17 VITALS
SYSTOLIC BLOOD PRESSURE: 185 MMHG | DIASTOLIC BLOOD PRESSURE: 97 MMHG | WEIGHT: 188 LBS | HEART RATE: 73 BPM | OXYGEN SATURATION: 99 % | TEMPERATURE: 97.5 F | HEIGHT: 65 IN | BODY MASS INDEX: 31.32 KG/M2 | RESPIRATION RATE: 15 BRPM

## 2023-03-17 VITALS — DIASTOLIC BLOOD PRESSURE: 88 MMHG | SYSTOLIC BLOOD PRESSURE: 140 MMHG

## 2023-03-17 PROCEDURE — 93000 ELECTROCARDIOGRAM COMPLETE: CPT

## 2023-03-17 PROCEDURE — G0439: CPT

## 2023-03-17 NOTE — HISTORY OF PRESENT ILLNESS
[de-identified] : COMES FOR CPE \par BP AT HOME 130-135/80-85 \par STRESS AT HOME WITH SON WITH PSYCHOSIS

## 2023-03-17 NOTE — ASSESSMENT
[FreeTextEntry1] : CPE OF 72 Y OLD MALE WITH PMX OF HTN ON DIET ;DYSLIPIDEMIA AND IFG = LABS ,EKG AND ECHO \par RTO 3-4 M

## 2023-03-17 NOTE — PHYSICAL EXAM
[No Acute Distress] : no acute distress [Well Developed] : well developed [Well-Appearing] : well-appearing [Normal Sclera/Conjunctiva] : normal sclera/conjunctiva [PERRL] : pupils equal round and reactive to light [EOMI] : extraocular movements intact [No JVD] : no jugular venous distention [No Lymphadenopathy] : no lymphadenopathy [Supple] : supple [Thyroid Normal, No Nodules] : the thyroid was normal and there were no nodules present [No Respiratory Distress] : no respiratory distress  [No Accessory Muscle Use] : no accessory muscle use [Clear to Auscultation] : lungs were clear to auscultation bilaterally [Normal Rate] : normal rate  [Regular Rhythm] : with a regular rhythm [Normal S1, S2] : normal S1 and S2 [No Murmur] : no murmur heard [No Carotid Bruits] : no carotid bruits [No Abdominal Bruit] : a ~M bruit was not heard ~T in the abdomen [No Varicosities] : no varicosities [Pedal Pulses Present] : the pedal pulses are present [No Edema] : there was no peripheral edema [No Palpable Aorta] : no palpable aorta [No Extremity Clubbing/Cyanosis] : no extremity clubbing/cyanosis [Soft] : abdomen soft [Non Tender] : non-tender [Non-distended] : non-distended [No Masses] : no abdominal mass palpated [No HSM] : no HSM [Normal Bowel Sounds] : normal bowel sounds [Normal Posterior Cervical Nodes] : no posterior cervical lymphadenopathy [Normal Anterior Cervical Nodes] : no anterior cervical lymphadenopathy [No CVA Tenderness] : no CVA  tenderness [No Spinal Tenderness] : no spinal tenderness [No Joint Swelling] : no joint swelling [Grossly Normal Strength/Tone] : grossly normal strength/tone [No Rash] : no rash [Coordination Grossly Intact] : coordination grossly intact [No Focal Deficits] : no focal deficits [Normal Affect] : the affect was normal [Normal Insight/Judgement] : insight and judgment were intact

## 2023-03-17 NOTE — REASON FOR VISIT
[Annual Wellness Visit] : an annual wellness visit patient seen and examined; pt a/f hypokalemia ; on thiazide, reports painful leg cramps for a few weeks along w/ fatigue, followed by diarrhea x 5days w/ abdominal cramping; pt also reports dyspnea and nausea. pt reports hx of opioid induced constipation (taken for back), uses rectal stool softners or  PO laxatives PRN, last use of laxatives was in 1/2018.     LMP 2/19    reviewed vs, labs, ekg  x 3     agree w/ PE findings as above ; pt is obese, in NAD, tired appearing, dry MM, soft, nontender abdomen      1. Hypokalemia: appreciate ICU recs, s/p PO and IV supplementation, monitor BMP, replete as needed, check EKG  2. diarrhea: pt w/ 5 days of loose stools, presumed viral in nature, no abdominal pain on exam, afebrile with no elevated WBC; pt reports no BM since admission in setting of receiving loperamide; monitor sxs, obtain CT a/p if change in clinical status (worsening abdominal sxs, fevers, n/v, worsening diarrhea). on IVFs  3. Metabolic alkalosis: switched to NS +40 mEq KCL, monitor BMP    4. dc topamax and follow up w/PCP and/or neuro re: weight loss  5. start alternative BP agent if BP remains elevated off thiazide

## 2023-03-18 LAB
25(OH)D3 SERPL-MCNC: 35 NG/ML
ALBUMIN SERPL ELPH-MCNC: 4.7 G/DL
ALP BLD-CCNC: 69 U/L
ALT SERPL-CCNC: 38 U/L
ANION GAP SERPL CALC-SCNC: 14 MMOL/L
APPEARANCE: CLEAR
AST SERPL-CCNC: 32 U/L
BASOPHILS # BLD AUTO: 0.02 K/UL
BASOPHILS NFR BLD AUTO: 0.3 %
BILIRUB SERPL-MCNC: 0.6 MG/DL
BILIRUBIN URINE: NEGATIVE
BLOOD URINE: NEGATIVE
BUN SERPL-MCNC: 16 MG/DL
CALCIUM SERPL-MCNC: 9.8 MG/DL
CHLORIDE SERPL-SCNC: 101 MMOL/L
CHOLEST SERPL-MCNC: 197 MG/DL
CO2 SERPL-SCNC: 24 MMOL/L
COLOR: NORMAL
CREAT SERPL-MCNC: 1.01 MG/DL
CREAT SPEC-SCNC: 72 MG/DL
EGFR: 79 ML/MIN/1.73M2
EOSINOPHIL # BLD AUTO: 0.05 K/UL
EOSINOPHIL NFR BLD AUTO: 0.7 %
GLUCOSE QUALITATIVE U: NEGATIVE
GLUCOSE SERPL-MCNC: 81 MG/DL
HCT VFR BLD CALC: 49 %
HDLC SERPL-MCNC: 65 MG/DL
HGB BLD-MCNC: 16 G/DL
IMM GRANULOCYTES NFR BLD AUTO: 0.1 %
KETONES URINE: NEGATIVE
LDLC SERPL CALC-MCNC: 111 MG/DL
LEUKOCYTE ESTERASE URINE: NEGATIVE
LYMPHOCYTES # BLD AUTO: 1.66 K/UL
LYMPHOCYTES NFR BLD AUTO: 21.9 %
MAN DIFF?: NORMAL
MCHC RBC-ENTMCNC: 30.9 PG
MCHC RBC-ENTMCNC: 32.7 GM/DL
MCV RBC AUTO: 94.6 FL
MICROALBUMIN 24H UR DL<=1MG/L-MCNC: 1.8 MG/DL
MICROALBUMIN/CREAT 24H UR-RTO: 25 MG/G
MONOCYTES # BLD AUTO: 0.64 K/UL
MONOCYTES NFR BLD AUTO: 8.5 %
NEUTROPHILS # BLD AUTO: 5.19 K/UL
NEUTROPHILS NFR BLD AUTO: 68.5 %
NITRITE URINE: NEGATIVE
NONHDLC SERPL-MCNC: 131 MG/DL
PH URINE: 6
PLATELET # BLD AUTO: 208 K/UL
POTASSIUM SERPL-SCNC: 4.2 MMOL/L
PROT SERPL-MCNC: 7.6 G/DL
PROTEIN URINE: NEGATIVE
PSA FREE FLD-MCNC: 47 %
PSA FREE SERPL-MCNC: 0.12 NG/ML
PSA SERPL-MCNC: 0.26 NG/ML
RBC # BLD: 5.18 M/UL
RBC # FLD: 14.1 %
SODIUM SERPL-SCNC: 139 MMOL/L
SPECIFIC GRAVITY URINE: 1.02
TRIGL SERPL-MCNC: 102 MG/DL
TSH SERPL-ACNC: 1.82 UIU/ML
UROBILINOGEN URINE: NORMAL
WBC # FLD AUTO: 7.57 K/UL

## 2023-03-27 ENCOUNTER — APPOINTMENT (OUTPATIENT)
Dept: CARDIOLOGY | Facility: CLINIC | Age: 73
End: 2023-03-27
Payer: MEDICARE

## 2023-03-27 PROCEDURE — 93306 TTE W/DOPPLER COMPLETE: CPT

## 2023-07-26 ENCOUNTER — APPOINTMENT (OUTPATIENT)
Dept: INTERNAL MEDICINE | Facility: CLINIC | Age: 73
End: 2023-07-26
Payer: MEDICARE

## 2023-07-26 VITALS
RESPIRATION RATE: 14 BRPM | HEIGHT: 65 IN | DIASTOLIC BLOOD PRESSURE: 87 MMHG | OXYGEN SATURATION: 99 % | TEMPERATURE: 98.3 F | HEART RATE: 63 BPM | WEIGHT: 186 LBS | BODY MASS INDEX: 30.99 KG/M2 | SYSTOLIC BLOOD PRESSURE: 168 MMHG

## 2023-07-26 VITALS — DIASTOLIC BLOOD PRESSURE: 80 MMHG | SYSTOLIC BLOOD PRESSURE: 142 MMHG

## 2023-07-26 DIAGNOSIS — N40.0 BENIGN PROSTATIC HYPERPLASIA WITHOUT LOWER URINARY TRACT SYMPMS: ICD-10-CM

## 2023-07-26 PROCEDURE — 99214 OFFICE O/P EST MOD 30 MIN: CPT

## 2023-07-26 NOTE — PHYSICAL EXAM
[No Acute Distress] : no acute distress [Normal Sclera/Conjunctiva] : normal sclera/conjunctiva [Normal Outer Ear/Nose] : the outer ears and nose were normal in appearance [No JVD] : no jugular venous distention [Rounded] : rounded [Normal] : normal [Soft, Nontender] : the abdomen was soft and nontender [No Mass] : no masses were palpated [No HSM] : no hepatosplenomegaly noted [No Rash] : no rash [Coordination Grossly Intact] : coordination grossly intact [Alert and Oriented x3] : oriented to person, place, and time

## 2023-07-26 NOTE — ASSESSMENT
[FreeTextEntry1] : 72 Y OLD MALE WITH PMX OF HTN ,IFG ,DYSLIPIDEMIA AND MILD OBESITY = LABS AND RTO 4 M

## 2023-07-27 LAB
ALBUMIN SERPL ELPH-MCNC: 4.6 G/DL
ALP BLD-CCNC: 68 U/L
ALT SERPL-CCNC: 30 U/L
ANION GAP SERPL CALC-SCNC: 12 MMOL/L
AST SERPL-CCNC: 25 U/L
BILIRUB SERPL-MCNC: 0.6 MG/DL
BUN SERPL-MCNC: 16 MG/DL
CALCIUM SERPL-MCNC: 9.8 MG/DL
CHLORIDE SERPL-SCNC: 103 MMOL/L
CHOLEST SERPL-MCNC: 191 MG/DL
CO2 SERPL-SCNC: 24 MMOL/L
CREAT SERPL-MCNC: 0.99 MG/DL
EGFR: 81 ML/MIN/1.73M2
GLUCOSE SERPL-MCNC: 86 MG/DL
HDLC SERPL-MCNC: 56 MG/DL
LDLC SERPL CALC-MCNC: 123 MG/DL
NONHDLC SERPL-MCNC: 135 MG/DL
POTASSIUM SERPL-SCNC: 4.8 MMOL/L
PROT SERPL-MCNC: 7.3 G/DL
SODIUM SERPL-SCNC: 139 MMOL/L
TRIGL SERPL-MCNC: 61 MG/DL

## 2023-08-03 LAB
ESTIMATED AVERAGE GLUCOSE: 108 MG/DL
HBA1C MFR BLD HPLC: 5.4 %

## 2023-08-17 ENCOUNTER — NON-APPOINTMENT (OUTPATIENT)
Age: 73
End: 2023-08-17

## 2023-08-17 ENCOUNTER — APPOINTMENT (OUTPATIENT)
Dept: OPHTHALMOLOGY | Facility: CLINIC | Age: 73
End: 2023-08-17
Payer: MEDICARE

## 2023-08-17 PROCEDURE — 92133 CPTRZD OPH DX IMG PST SGM ON: CPT

## 2023-08-17 PROCEDURE — 92083 EXTENDED VISUAL FIELD XM: CPT

## 2023-08-17 PROCEDURE — 92012 INTRM OPH EXAM EST PATIENT: CPT

## 2023-11-08 ENCOUNTER — APPOINTMENT (OUTPATIENT)
Dept: INTERNAL MEDICINE | Facility: CLINIC | Age: 73
End: 2023-11-08

## 2023-11-29 ENCOUNTER — APPOINTMENT (OUTPATIENT)
Dept: INTERNAL MEDICINE | Facility: CLINIC | Age: 73
End: 2023-11-29
Payer: MEDICARE

## 2023-11-29 VITALS
WEIGHT: 186 LBS | RESPIRATION RATE: 15 BRPM | DIASTOLIC BLOOD PRESSURE: 88 MMHG | BODY MASS INDEX: 30.99 KG/M2 | HEIGHT: 65 IN | SYSTOLIC BLOOD PRESSURE: 173 MMHG | OXYGEN SATURATION: 97 % | TEMPERATURE: 97.2 F | HEART RATE: 78 BPM

## 2023-11-29 VITALS — DIASTOLIC BLOOD PRESSURE: 90 MMHG | SYSTOLIC BLOOD PRESSURE: 142 MMHG

## 2023-11-29 LAB
ALBUMIN SERPL ELPH-MCNC: 4.3 G/DL
ALP BLD-CCNC: 65 U/L
ALT SERPL-CCNC: 29 U/L
ANION GAP SERPL CALC-SCNC: 13 MMOL/L
AST SERPL-CCNC: 22 U/L
BILIRUB SERPL-MCNC: 0.4 MG/DL
BUN SERPL-MCNC: 14 MG/DL
CALCIUM SERPL-MCNC: 9.4 MG/DL
CHLORIDE SERPL-SCNC: 104 MMOL/L
CHOLEST SERPL-MCNC: 163 MG/DL
CO2 SERPL-SCNC: 24 MMOL/L
CREAT SERPL-MCNC: 0.92 MG/DL
EGFR: 88 ML/MIN/1.73M2
GLUCOSE SERPL-MCNC: 89 MG/DL
HDLC SERPL-MCNC: 61 MG/DL
LDLC SERPL CALC-MCNC: 90 MG/DL
NONHDLC SERPL-MCNC: 101 MG/DL
POTASSIUM SERPL-SCNC: 4.1 MMOL/L
PROT SERPL-MCNC: 7.1 G/DL
SODIUM SERPL-SCNC: 141 MMOL/L
TRIGL SERPL-MCNC: 53 MG/DL

## 2023-11-29 PROCEDURE — 99214 OFFICE O/P EST MOD 30 MIN: CPT

## 2024-01-17 ENCOUNTER — APPOINTMENT (OUTPATIENT)
Dept: INTERNAL MEDICINE | Facility: CLINIC | Age: 74
End: 2024-01-17

## 2024-02-08 ENCOUNTER — NON-APPOINTMENT (OUTPATIENT)
Age: 74
End: 2024-02-08

## 2024-02-08 ENCOUNTER — APPOINTMENT (OUTPATIENT)
Dept: OPHTHALMOLOGY | Facility: CLINIC | Age: 74
End: 2024-02-08
Payer: MEDICARE

## 2024-02-08 PROCEDURE — 92250 FUNDUS PHOTOGRAPHY W/I&R: CPT

## 2024-02-08 PROCEDURE — 92014 COMPRE OPH EXAM EST PT 1/>: CPT

## 2024-02-08 PROCEDURE — 92083 EXTENDED VISUAL FIELD XM: CPT

## 2024-02-08 PROCEDURE — 92020 GONIOSCOPY: CPT

## 2024-03-18 ENCOUNTER — LABORATORY RESULT (OUTPATIENT)
Age: 74
End: 2024-03-18

## 2024-03-18 ENCOUNTER — APPOINTMENT (OUTPATIENT)
Dept: INTERNAL MEDICINE | Facility: CLINIC | Age: 74
End: 2024-03-18
Payer: MEDICARE

## 2024-03-18 ENCOUNTER — NON-APPOINTMENT (OUTPATIENT)
Age: 74
End: 2024-03-18

## 2024-03-18 VITALS
DIASTOLIC BLOOD PRESSURE: 80 MMHG | WEIGHT: 188 LBS | BODY MASS INDEX: 31.32 KG/M2 | HEART RATE: 60 BPM | SYSTOLIC BLOOD PRESSURE: 164 MMHG | HEIGHT: 65 IN | OXYGEN SATURATION: 98 % | TEMPERATURE: 98.1 F | RESPIRATION RATE: 14 BRPM

## 2024-03-18 VITALS — SYSTOLIC BLOOD PRESSURE: 140 MMHG | DIASTOLIC BLOOD PRESSURE: 78 MMHG

## 2024-03-18 DIAGNOSIS — Z00.00 ENCOUNTER FOR GENERAL ADULT MEDICAL EXAMINATION W/OUT ABNORMAL FINDINGS: ICD-10-CM

## 2024-03-18 DIAGNOSIS — R73.01 IMPAIRED FASTING GLUCOSE: ICD-10-CM

## 2024-03-18 LAB
APPEARANCE: CLEAR
BILIRUBIN URINE: NEGATIVE
BLOOD URINE: NEGATIVE
COLOR: YELLOW
GLUCOSE QUALITATIVE U: NEGATIVE MG/DL
KETONES URINE: NEGATIVE MG/DL
LEUKOCYTE ESTERASE URINE: ABNORMAL
NITRITE URINE: NEGATIVE
PH URINE: 5.5
PROTEIN URINE: NEGATIVE MG/DL
SPECIFIC GRAVITY URINE: 1.02
UROBILINOGEN URINE: 0.2 MG/DL

## 2024-03-18 PROCEDURE — 93000 ELECTROCARDIOGRAM COMPLETE: CPT

## 2024-03-18 PROCEDURE — G0439: CPT

## 2024-03-18 RX ORDER — TELMISARTAN 20 MG/1
20 TABLET ORAL DAILY
Qty: 100 | Refills: 0 | Status: DISCONTINUED | COMMUNITY
End: 2024-03-18

## 2024-03-18 NOTE — ASSESSMENT
[FreeTextEntry1] : CPE OF 73 Y OLD MALE WITH PMX OF HTN STAGE 1,MILD DYSLIPIDEMIA AND MILD OBESITY = LABS ,EKG AND CARDIO EVAL  RTO 4 M

## 2024-03-18 NOTE — PHYSICAL EXAM
[No Acute Distress] : no acute distress [Normal Sclera/Conjunctiva] : normal sclera/conjunctiva [Normal Outer Ear/Nose] : the outer ears and nose were normal in appearance [Normal] : normal rate, regular rhythm, normal S1 and S2 and no murmur heard [No Edema] : there was no peripheral edema [Soft] : abdomen soft [Non Tender] : non-tender [Normal Bowel Sounds] : normal bowel sounds [Normal Anterior Cervical Nodes] : no anterior cervical lymphadenopathy [No CVA Tenderness] : no CVA  tenderness [No Rash] : no rash [Coordination Grossly Intact] : coordination grossly intact [No Focal Deficits] : no focal deficits [Alert and Oriented x3] : oriented to person, place, and time

## 2024-03-18 NOTE — HEALTH RISK ASSESSMENT
[Very Good] : ~his/her~  mood as very good [Yes] : Yes [Monthly or less (1 pt)] : Monthly or less (1 point) [1 or 2 (0 pts)] : 1 or 2 (0 points) [Never (0 pts)] : Never (0 points) [No] : In the past 12 months have you used drugs other than those required for medical reasons? No [No falls in past year] : Patient reported no falls in the past year [0] : 2) Feeling down, depressed, or hopeless: Not at all (0) [With Family] : lives with family [# of Members in Household ___] :  household currently consist of [unfilled] member(s) [Retired] : retired [] :  [# Of Children ___] : has [unfilled] children [Sexually Active] : sexually active [Feels Safe at Home] : Feels safe at home [Former] : Former [EyeExamDate] : 02/24 [ColonoscopyDate] : 05/22 [de-identified] : Quit 30+ years ago

## 2024-03-19 LAB
25(OH)D3 SERPL-MCNC: 38.6 NG/ML
ALBUMIN SERPL ELPH-MCNC: 4.6 G/DL
ALP BLD-CCNC: 59 U/L
ALT SERPL-CCNC: 30 U/L
ANION GAP SERPL CALC-SCNC: 12 MMOL/L
AST SERPL-CCNC: 27 U/L
BILIRUB SERPL-MCNC: 0.6 MG/DL
BUN SERPL-MCNC: 17 MG/DL
CALCIUM SERPL-MCNC: 9.4 MG/DL
CHLORIDE SERPL-SCNC: 104 MMOL/L
CHOLEST SERPL-MCNC: 197 MG/DL
CO2 SERPL-SCNC: 23 MMOL/L
CREAT SERPL-MCNC: 0.99 MG/DL
EGFR: 80 ML/MIN/1.73M2
GLUCOSE SERPL-MCNC: 83 MG/DL
HDLC SERPL-MCNC: 60 MG/DL
LDLC SERPL CALC-MCNC: 122 MG/DL
NONHDLC SERPL-MCNC: 138 MG/DL
POTASSIUM SERPL-SCNC: 4.6 MMOL/L
PROT SERPL-MCNC: 7.3 G/DL
PSA FREE FLD-MCNC: 53 %
PSA FREE SERPL-MCNC: 0.13 NG/ML
PSA SERPL-MCNC: 0.24 NG/ML
SODIUM SERPL-SCNC: 139 MMOL/L
TRIGL SERPL-MCNC: 86 MG/DL
TSH SERPL-ACNC: 1.87 UIU/ML
VIT B12 SERPL-MCNC: 376 PG/ML

## 2024-03-20 LAB
HCT VFR BLD CALC: 46.8 %
HGB BLD-MCNC: 15.9 G/DL
MCHC RBC-ENTMCNC: 30.5 PG
MCHC RBC-ENTMCNC: 34 GM/DL
MCV RBC AUTO: 89.8 FL
PLATELET # BLD AUTO: 217 K/UL
RBC # BLD: 5.21 M/UL
RBC # FLD: 13.6 %
WBC # FLD AUTO: 6.74 K/UL

## 2024-03-25 ENCOUNTER — APPOINTMENT (OUTPATIENT)
Dept: HEART AND VASCULAR | Facility: CLINIC | Age: 74
End: 2024-03-25
Payer: MEDICARE

## 2024-03-25 VITALS
TEMPERATURE: 98 F | SYSTOLIC BLOOD PRESSURE: 188 MMHG | WEIGHT: 188 LBS | OXYGEN SATURATION: 99 % | HEIGHT: 65 IN | HEART RATE: 61 BPM | DIASTOLIC BLOOD PRESSURE: 76 MMHG | RESPIRATION RATE: 16 BRPM | BODY MASS INDEX: 31.32 KG/M2

## 2024-03-25 VITALS — DIASTOLIC BLOOD PRESSURE: 83 MMHG | SYSTOLIC BLOOD PRESSURE: 179 MMHG

## 2024-03-25 DIAGNOSIS — R93.1 ABNORMAL FINDINGS ON DIAGNOSTIC IMAGING OF HEART AND CORONARY CIRCULATION: ICD-10-CM

## 2024-03-25 DIAGNOSIS — I45.10 UNSPECIFIED RIGHT BUNDLE-BRANCH BLOCK: ICD-10-CM

## 2024-03-25 DIAGNOSIS — E78.5 HYPERLIPIDEMIA, UNSPECIFIED: ICD-10-CM

## 2024-03-25 DIAGNOSIS — I10 ESSENTIAL (PRIMARY) HYPERTENSION: ICD-10-CM

## 2024-03-25 PROCEDURE — 99204 OFFICE O/P NEW MOD 45 MIN: CPT

## 2024-03-25 PROCEDURE — G2211 COMPLEX E/M VISIT ADD ON: CPT

## 2024-03-25 RX ORDER — ATORVASTATIN CALCIUM 20 MG/1
20 TABLET, FILM COATED ORAL
Qty: 30 | Refills: 3 | Status: ACTIVE | COMMUNITY
Start: 2024-03-25 | End: 1900-01-01

## 2024-03-25 RX ORDER — LOSARTAN POTASSIUM 25 MG/1
25 TABLET, FILM COATED ORAL DAILY
Qty: 30 | Refills: 3 | Status: ACTIVE | COMMUNITY
Start: 2024-03-25 | End: 1900-01-01

## 2024-03-25 NOTE — PHYSICAL EXAM
[Well Developed] : well developed [Well Nourished] : well nourished [No Acute Distress] : no acute distress [Normal Venous Pressure] : normal venous pressure [No Carotid Bruit] : no carotid bruit [Normal S1, S2] : normal S1, S2 [No Murmur] : no murmur [No Rub] : no rub [Clear Lung Fields] : clear lung fields [Good Air Entry] : good air entry [Normal Gait] : normal gait [No Respiratory Distress] : no respiratory distress  [No Cyanosis] : no cyanosis [No Clubbing] : no clubbing [Moves all extremities] : moves all extremities [No Focal Deficits] : no focal deficits [Normal Speech] : normal speech [Alert and Oriented] : alert and oriented [Normal memory] : normal memory [Edema ___] : edema [unfilled]

## 2024-03-25 NOTE — ASSESSMENT
[FreeTextEntry1] : ASH LOPEZ is a 73 year M with elevated BP in the office, however, he claims it is white coat. He had an echo ~1 yr ago that is c/w with hypertensive heart disease, preserved LV function and basal inferior wall hypokinesis. ECG w left axis and RBBB. No prior ischemia evaluation. I believe he has a combination of HTN with a component of white coat.  10 yr ASCVD risk is 32%  - I reviewed ECG --> abnormal  - I reviewed labs - start losartan 25 mg daily - start atorvastatin 20 mg daily  - Schedule a stress MPI to evaluate for ischemia and risk stratify - to check BP at home and bring a BP log to next visit.  - Increase ambulation as tolerated to aim for approximately 30 minutes of moderate activity 5 days a week.  Heart healthy diet low in sodium, sugars and saturated fats and high in fruits, vegetables and whole grains.  Weight loss.   Patient advised to go to the nearest ED whenever any symptoms persist and/or worsens.  Patient/Family/Caregiver verbalized complete understanding of these instructions.  I spent a total of 45 minutes with more than 50% spent on counseling and coordinating care.   RTO in 1 wk for BP check

## 2024-03-25 NOTE — REVIEW OF SYSTEMS
[Fever] : no fever [Headache] : no headache [Chills] : no chills [Feeling Fatigued] : not feeling fatigued [Blurry Vision] : no blurred vision [Seeing Double (Diplopia)] : no diplopia [SOB] : no shortness of breath [Dyspnea on exertion] : not dyspnea during exertion [Chest Discomfort] : no chest discomfort [Lower Ext Edema] : no extremity edema [Leg Claudication] : no intermittent leg claudication [Palpitations] : no palpitations [Orthopnea] : no orthopnea [PND] : no PND [Syncope] : no syncope [Cough] : no cough [Wheezing] : no wheezing [Dizziness] : no dizziness [Tremor] : no tremor was seen [Tingling (Paresthesia)] : no tingling [Numbness (Hypoesthesia)] : no numbness [Convulsions] : no convulsions [Weakness] : no weakness [Limb Weakness (Paresis)] : no limb weakness (Paresis) [Speech Disturbance] : no speech disturbance [Confusion] : no confusion was observed [Memory Lapses Or Loss] : no memory lapses or loss [Depression] : no depression [Anxiety] : no anxiety [Suicidal] : not suicidal [Under Stress] : not under stress [Easy Bleeding] : no tendency for easy bleeding [Swollen Glands] : no swollen glands [Easy Bruising] : no tendency for easy bruising

## 2024-03-25 NOTE — HISTORY OF PRESENT ILLNESS
[FreeTextEntry1] : ASH LOPEZ is a 73 y.o. M with medical history significant for HLD, RBBB.  He is here for cardiac evaluation. BP is always elevated in the office. However, he believes he has white coat HTN. He states SBP at home ranges 117-135 mmHg. Denies CP, SOB, palpitations, orthopnea, dizziness, syncope, LH, DARDEN, edema  Patient denies changes in his exercise tolerance during the past 6 months. He can walk 10+ blocks and can climb 3 flights of stairs.  Sleeps with 1 pillow.   He denies history of MI, CVA, DM.  Denies family history of premature ASCVD and /or SCD  No hospitalizations in the past 3 years.  Data Labs (3/18/24) TSH 1.87, K 4.6, CRE 0.99, LFT's wnl, eGFR 80, TG 86, , HDL 60, , NON-, HGB 15.9, HCT 46.8  ECG (3/18/24): NSR at 58 bpm w left axis and RBBB  ECHO (3/27/23): Endocardium not well visualized, grossly preserved LVEF w hypokinesis of the basal inferior wall. EF 55%. Concentric LV remodeling. Mild grade IDD. Mildly enlarged RV w nl function. Mild LAD. Mild RAD. Mild MAC. Mild MR and TR. PASP 27 mmHg. Trace AR. Prox asc ao is mildly dilated (4.0 cm.

## 2024-04-10 ENCOUNTER — RESULT REVIEW (OUTPATIENT)
Age: 74
End: 2024-04-10

## 2024-04-10 ENCOUNTER — NON-APPOINTMENT (OUTPATIENT)
Age: 74
End: 2024-04-10

## 2024-04-10 ENCOUNTER — OUTPATIENT (OUTPATIENT)
Dept: OUTPATIENT SERVICES | Facility: HOSPITAL | Age: 74
LOS: 1 days | End: 2024-04-10
Payer: MEDICARE

## 2024-04-10 DIAGNOSIS — R93.1 ABNORMAL FINDINGS ON DIAGNOSTIC IMAGING OF HEART AND CORONARY CIRCULATION: ICD-10-CM

## 2024-04-10 PROCEDURE — 93017 CV STRESS TEST TRACING ONLY: CPT

## 2024-04-10 PROCEDURE — A9500: CPT

## 2024-04-10 PROCEDURE — 78452 HT MUSCLE IMAGE SPECT MULT: CPT | Mod: 26,MH

## 2024-04-10 PROCEDURE — 78452 HT MUSCLE IMAGE SPECT MULT: CPT

## 2024-04-10 PROCEDURE — 93018 CV STRESS TEST I&R ONLY: CPT

## 2024-04-10 PROCEDURE — 93016 CV STRESS TEST SUPVJ ONLY: CPT

## 2024-07-11 ENCOUNTER — APPOINTMENT (OUTPATIENT)
Dept: OPHTHALMOLOGY | Facility: CLINIC | Age: 74
End: 2024-07-11
Payer: MEDICARE

## 2024-07-11 ENCOUNTER — NON-APPOINTMENT (OUTPATIENT)
Age: 74
End: 2024-07-11

## 2024-07-11 PROCEDURE — 92012 INTRM OPH EXAM EST PATIENT: CPT

## 2024-07-11 PROCEDURE — 92133 CPTRZD OPH DX IMG PST SGM ON: CPT

## 2024-07-11 PROCEDURE — 92083 EXTENDED VISUAL FIELD XM: CPT

## 2024-07-22 ENCOUNTER — APPOINTMENT (OUTPATIENT)
Dept: INTERNAL MEDICINE | Facility: CLINIC | Age: 74
End: 2024-07-22

## 2024-08-02 ENCOUNTER — APPOINTMENT (OUTPATIENT)
Age: 74
End: 2024-08-02

## 2024-08-02 PROCEDURE — 36415 COLL VENOUS BLD VENIPUNCTURE: CPT

## 2024-08-03 LAB
ALBUMIN SERPL ELPH-MCNC: 4.4 G/DL
ALP BLD-CCNC: 57 U/L
ALT SERPL-CCNC: 29 U/L
ANION GAP SERPL CALC-SCNC: 11 MMOL/L
AST SERPL-CCNC: 25 U/L
BILIRUB SERPL-MCNC: 0.6 MG/DL
BUN SERPL-MCNC: 13 MG/DL
CALCIUM SERPL-MCNC: 9.3 MG/DL
CHLORIDE SERPL-SCNC: 107 MMOL/L
CHOLEST SERPL-MCNC: 179 MG/DL
CO2 SERPL-SCNC: 24 MMOL/L
CREAT SERPL-MCNC: 1.03 MG/DL
EGFR: 77 ML/MIN/1.73M2
ESTIMATED AVERAGE GLUCOSE: 108 MG/DL
GLUCOSE SERPL-MCNC: 84 MG/DL
HBA1C MFR BLD HPLC: 5.4 %
HDLC SERPL-MCNC: 57 MG/DL
LDLC SERPL CALC-MCNC: 108 MG/DL
NONHDLC SERPL-MCNC: 121 MG/DL
POTASSIUM SERPL-SCNC: 4.5 MMOL/L
PROT SERPL-MCNC: 6.8 G/DL
SODIUM SERPL-SCNC: 142 MMOL/L
TRIGL SERPL-MCNC: 73 MG/DL

## 2024-08-07 ENCOUNTER — APPOINTMENT (OUTPATIENT)
Age: 74
End: 2024-08-07

## 2024-08-07 PROCEDURE — 99214 OFFICE O/P EST MOD 30 MIN: CPT

## 2024-08-07 NOTE — ASSESSMENT
[FreeTextEntry1] : 73 Y OLD MALE WITH PMX OF HTN AND DYSLIPIDEMIA ON LIFESTYLE CHANGES = CARDIO 2ND OPINION  RTO 3 M

## 2024-08-07 NOTE — HISTORY OF PRESENT ILLNESS
[de-identified] : COMES FOR F/U ;OFFERS NO NEW COMPLAINS ;TAKING NO MEDS  BP AT HOME 125-135/78-88

## 2024-08-07 NOTE — PHYSICAL EXAM
[No Acute Distress] : no acute distress [Normal Sclera/Conjunctiva] : normal sclera/conjunctiva [Normal Outer Ear/Nose] : the outer ears and nose were normal in appearance [No JVD] : no jugular venous distention [No Edema] : there was no peripheral edema [Normal] : soft, non-tender, non-distended, no masses palpated, no HSM and normal bowel sounds [Normal Anterior Cervical Nodes] : no anterior cervical lymphadenopathy [Coordination Grossly Intact] : coordination grossly intact [No Focal Deficits] : no focal deficits [Alert and Oriented x3] : oriented to person, place, and time

## 2024-11-04 ENCOUNTER — APPOINTMENT (OUTPATIENT)
Age: 74
End: 2024-11-04
Payer: MEDICARE

## 2024-11-04 VITALS
RESPIRATION RATE: 14 BRPM | HEART RATE: 65 BPM | DIASTOLIC BLOOD PRESSURE: 85 MMHG | TEMPERATURE: 97.5 F | OXYGEN SATURATION: 99 % | WEIGHT: 183 LBS | SYSTOLIC BLOOD PRESSURE: 168 MMHG | HEIGHT: 65 IN | BODY MASS INDEX: 30.49 KG/M2

## 2024-11-04 DIAGNOSIS — R73.01 IMPAIRED FASTING GLUCOSE: ICD-10-CM

## 2024-11-04 DIAGNOSIS — I10 ESSENTIAL (PRIMARY) HYPERTENSION: ICD-10-CM

## 2024-11-04 DIAGNOSIS — E78.5 HYPERLIPIDEMIA, UNSPECIFIED: ICD-10-CM

## 2024-11-04 PROCEDURE — 99214 OFFICE O/P EST MOD 30 MIN: CPT

## 2024-11-05 LAB
ALBUMIN SERPL ELPH-MCNC: 4.5 G/DL
ALP BLD-CCNC: 60 U/L
ALT SERPL-CCNC: 28 U/L
ANION GAP SERPL CALC-SCNC: 11 MMOL/L
AST SERPL-CCNC: 24 U/L
BILIRUB SERPL-MCNC: 0.6 MG/DL
BUN SERPL-MCNC: 15 MG/DL
CALCIUM SERPL-MCNC: 9.4 MG/DL
CHLORIDE SERPL-SCNC: 104 MMOL/L
CHOLEST SERPL-MCNC: 206 MG/DL
CO2 SERPL-SCNC: 26 MMOL/L
CREAT SERPL-MCNC: 1.02 MG/DL
EGFR: 78 ML/MIN/1.73M2
GLUCOSE SERPL-MCNC: 86 MG/DL
HDLC SERPL-MCNC: 59 MG/DL
LDLC SERPL CALC-MCNC: 127 MG/DL
NONHDLC SERPL-MCNC: 147 MG/DL
POTASSIUM SERPL-SCNC: 4.3 MMOL/L
PROT SERPL-MCNC: 7.2 G/DL
SODIUM SERPL-SCNC: 141 MMOL/L
TRIGL SERPL-MCNC: 110 MG/DL

## 2025-01-27 ENCOUNTER — APPOINTMENT (OUTPATIENT)
Dept: OPHTHALMOLOGY | Facility: CLINIC | Age: 75
End: 2025-01-27
Payer: MEDICARE

## 2025-01-27 ENCOUNTER — NON-APPOINTMENT (OUTPATIENT)
Age: 75
End: 2025-01-27

## 2025-01-27 PROCEDURE — 92014 COMPRE OPH EXAM EST PT 1/>: CPT

## 2025-01-27 PROCEDURE — 92020 GONIOSCOPY: CPT

## 2025-01-27 PROCEDURE — 92025 CPTRIZED CORNEAL TOPOGRAPHY: CPT

## 2025-01-27 PROCEDURE — 92083 EXTENDED VISUAL FIELD XM: CPT

## 2025-01-27 PROCEDURE — 92250 FUNDUS PHOTOGRAPHY W/I&R: CPT

## 2025-01-27 PROCEDURE — 92136 OPHTHALMIC BIOMETRY: CPT

## 2025-03-18 ENCOUNTER — APPOINTMENT (OUTPATIENT)
Age: 75
End: 2025-03-18
Payer: MEDICARE

## 2025-03-18 PROCEDURE — 36415 COLL VENOUS BLD VENIPUNCTURE: CPT

## 2025-03-19 ENCOUNTER — NON-APPOINTMENT (OUTPATIENT)
Age: 75
End: 2025-03-19

## 2025-03-19 ENCOUNTER — APPOINTMENT (OUTPATIENT)
Age: 75
End: 2025-03-19
Payer: MEDICARE

## 2025-03-19 VITALS
BODY MASS INDEX: 30.66 KG/M2 | RESPIRATION RATE: 14 BRPM | DIASTOLIC BLOOD PRESSURE: 86 MMHG | HEART RATE: 63 BPM | OXYGEN SATURATION: 99 % | HEIGHT: 65 IN | WEIGHT: 184 LBS | SYSTOLIC BLOOD PRESSURE: 153 MMHG | TEMPERATURE: 98 F

## 2025-03-19 VITALS — DIASTOLIC BLOOD PRESSURE: 86 MMHG | SYSTOLIC BLOOD PRESSURE: 140 MMHG

## 2025-03-19 DIAGNOSIS — R73.01 IMPAIRED FASTING GLUCOSE: ICD-10-CM

## 2025-03-19 DIAGNOSIS — R93.1 ABNORMAL FINDINGS ON DIAGNOSTIC IMAGING OF HEART AND CORONARY CIRCULATION: ICD-10-CM

## 2025-03-19 DIAGNOSIS — Z00.00 ENCOUNTER FOR GENERAL ADULT MEDICAL EXAMINATION W/OUT ABNORMAL FINDINGS: ICD-10-CM

## 2025-03-19 DIAGNOSIS — E78.5 HYPERLIPIDEMIA, UNSPECIFIED: ICD-10-CM

## 2025-03-19 PROCEDURE — 93000 ELECTROCARDIOGRAM COMPLETE: CPT

## 2025-03-19 PROCEDURE — G0439: CPT

## 2025-03-21 ENCOUNTER — APPOINTMENT (OUTPATIENT)
Age: 75
End: 2025-03-21

## 2025-06-16 ENCOUNTER — APPOINTMENT (OUTPATIENT)
Age: 75
End: 2025-06-16

## 2025-06-16 PROCEDURE — 36415 COLL VENOUS BLD VENIPUNCTURE: CPT

## 2025-06-18 ENCOUNTER — APPOINTMENT (OUTPATIENT)
Age: 75
End: 2025-06-18
Payer: MEDICARE

## 2025-06-18 VITALS
SYSTOLIC BLOOD PRESSURE: 157 MMHG | HEART RATE: 68 BPM | OXYGEN SATURATION: 98 % | TEMPERATURE: 98.5 F | DIASTOLIC BLOOD PRESSURE: 88 MMHG | BODY MASS INDEX: 29.82 KG/M2 | HEIGHT: 65 IN | RESPIRATION RATE: 15 BRPM | WEIGHT: 179 LBS

## 2025-06-18 VITALS — SYSTOLIC BLOOD PRESSURE: 150 MMHG | DIASTOLIC BLOOD PRESSURE: 90 MMHG

## 2025-06-18 PROCEDURE — 99214 OFFICE O/P EST MOD 30 MIN: CPT

## 2025-07-24 ENCOUNTER — APPOINTMENT (OUTPATIENT)
Age: 75
End: 2025-07-24

## 2025-07-28 ENCOUNTER — NON-APPOINTMENT (OUTPATIENT)
Age: 75
End: 2025-07-28

## 2025-07-28 ENCOUNTER — APPOINTMENT (OUTPATIENT)
Dept: OPHTHALMOLOGY | Facility: CLINIC | Age: 75
End: 2025-07-28
Payer: MEDICARE

## 2025-07-28 PROCEDURE — 92083 EXTENDED VISUAL FIELD XM: CPT

## 2025-07-28 PROCEDURE — 92012 INTRM OPH EXAM EST PATIENT: CPT

## 2025-07-28 PROCEDURE — 92133 CPTRZD OPH DX IMG PST SGM ON: CPT

## 2025-09-16 ENCOUNTER — APPOINTMENT (OUTPATIENT)
Age: 75
End: 2025-09-16

## 2025-09-19 ENCOUNTER — APPOINTMENT (OUTPATIENT)
Age: 75
End: 2025-09-19
Payer: MEDICARE

## 2025-09-19 VITALS
BODY MASS INDEX: 29.32 KG/M2 | SYSTOLIC BLOOD PRESSURE: 154 MMHG | RESPIRATION RATE: 14 BRPM | HEIGHT: 65 IN | OXYGEN SATURATION: 100 % | DIASTOLIC BLOOD PRESSURE: 90 MMHG | WEIGHT: 176 LBS | TEMPERATURE: 98.1 F | HEART RATE: 75 BPM

## 2025-09-19 VITALS — SYSTOLIC BLOOD PRESSURE: 140 MMHG | DIASTOLIC BLOOD PRESSURE: 85 MMHG

## 2025-09-19 DIAGNOSIS — R73.01 IMPAIRED FASTING GLUCOSE: ICD-10-CM

## 2025-09-19 DIAGNOSIS — I10 ESSENTIAL (PRIMARY) HYPERTENSION: ICD-10-CM

## 2025-09-19 DIAGNOSIS — E78.5 HYPERLIPIDEMIA, UNSPECIFIED: ICD-10-CM

## 2025-09-19 PROCEDURE — 99214 OFFICE O/P EST MOD 30 MIN: CPT
